# Patient Record
Sex: FEMALE | Race: BLACK OR AFRICAN AMERICAN
[De-identification: names, ages, dates, MRNs, and addresses within clinical notes are randomized per-mention and may not be internally consistent; named-entity substitution may affect disease eponyms.]

---

## 2018-05-23 ENCOUNTER — HOSPITAL ENCOUNTER (EMERGENCY)
Dept: HOSPITAL 74 - JERFT | Age: 68
Discharge: HOME | End: 2018-05-23
Payer: COMMERCIAL

## 2018-05-23 VITALS — DIASTOLIC BLOOD PRESSURE: 67 MMHG | SYSTOLIC BLOOD PRESSURE: 116 MMHG | TEMPERATURE: 98.2 F | HEART RATE: 66 BPM

## 2018-05-23 VITALS — BODY MASS INDEX: 27.3 KG/M2

## 2018-05-23 DIAGNOSIS — R73.03: ICD-10-CM

## 2018-05-23 DIAGNOSIS — I10: ICD-10-CM

## 2018-05-23 DIAGNOSIS — M54.6: Primary | ICD-10-CM

## 2018-05-23 LAB
APPEARANCE UR: CLEAR
BILIRUB UR STRIP.AUTO-MCNC: NEGATIVE MG/DL
COLOR UR: (no result)
KETONES UR QL STRIP: NEGATIVE
LEUKOCYTE ESTERASE UR QL STRIP.AUTO: (no result)
MUCOUS THREADS URNS QL MICRO: (no result)
NITRITE UR QL STRIP: NEGATIVE
PH UR: 6 [PH] (ref 5–8)
PROT UR QL STRIP: NEGATIVE
PROT UR QL STRIP: NEGATIVE
SP GR UR: 1.01 (ref 1–1.03)
UROBILINOGEN UR STRIP-MCNC: NEGATIVE MG/DL (ref 0.2–1)

## 2018-05-23 PROCEDURE — 3E0233Z INTRODUCTION OF ANTI-INFLAMMATORY INTO MUSCLE, PERCUTANEOUS APPROACH: ICD-10-PCS

## 2018-05-23 NOTE — PDOC
History of Present Illness





- General


Chief Complaint: Back Pain


Stated Complaint: BACK PAIN


Time Seen by Provider: 05/23/18 08:41


History Source: Patient


Exam Limitations: No Limitations





- History of Present Illness


Initial Comments: 





05/23/18 08:59


Best Contact: [phone]


PCP: Jessica





Pmhx: HDL, borderline diabetes, hypertension 


Pshx: Right thyroidectomy/2002, [facility]


Allergies: Percocet/nausea


FH:unk


Social Hx: Ciarettes/  0     Alcohol/     0      Drugs/0


LMP:


05/23/18 09:31


67-year-old female presents to the ER complaining of mid to lower right sided 

back pain 4 days. Pain is described as 7/10 dull nonradiating intermittent 

discomfort. 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 

facility that has her constantly doing strenuous activities. Patient states she 

took ibuprofen earlier yesterday morning with minimal relief. Patient denies 

any fall/injuries, she denies neck pain/stiffness, bladder or bowel dysfunction

, abdominal discomfort flank pains. Patient had similar symptoms from muscular 

back strains.





Past History





- Past Medical History


Allergies/Adverse Reactions: 


 Allergies











Allergy/AdvReac Type Severity Reaction Status Date / Time


 


No Known Allergies Allergy   Verified 05/23/18 08:29











Home Medications: 


Ambulatory Orders





Losartan 50Mg/Hctz 12.5MG [Hyzaar -] 1 tab PO DAILY 12/07/16 








Asthma: Yes


COPD: No


HTN: Yes


Hypercholesterolemia: Yes





- Surgical History


Abdominal Surgery: No


Appendectomy: No


Cardiac Surgery: No


Cholecystectomy: No


Gastric Stapling: No


GI Surgery: No


Lung Surgery: No


Neurologic Surgery: No


Orthopedic Surgery: No





- Immunization History


Immunization Up to Date: Yes





- Suicide/Smoking/Psychosocial Hx


Smoking Status: No


Smoking History: Never smoked


Have you smoked in the past 12 months: No


Number of Cigarettes Smoked Daily: 0


Information on smoking cessation initiated: No


Hx Alcohol Use: No


Drug/Substance Use Hx: No


Substance Use Type: None


Hx Substance Use Treatment: No





Trauma Specific PMHX





- Complaint Specific PMHX


Arthritis: No


Back Injury: No


Neck Injury: No


Hx Sacro Iliac Joint Dysfunction: No





**Review of Systems





- Review of Systems


Able to Perform ROS?: Yes


Comments:: 





05/23/18 08:59


CONSTITUTIONAL: 


Absent: fever, chills, diaphoresis, generalized weakness, malaise, loss of 

appetite


HEENT: 


Absent: rhinorrhea, nasal congestion, throat pain, throat swelling, difficulty 

swallowing, mouth swelling, ear pain, eye pain, visual Changes


CARDIOVASCULAR: 


Absent: chest pain, loss of consciousness, palpitations, irregular heart rate, 

peripheral edema


RESPIRATORY: 


Absent: cough, shortness of breath, dyspnea with exertion, orthopnea, wheezing, 

stridor, hemoptysis


GASTROINTESTINAL:


Absent: abdominal pain, abdominal distension, nausea, vomiting, diarrhea, 

constipation, melena, hematochezia


GENITOURINARY: 


Absent: dysuria, frequency, urgency, hesitancy, hematuria, flank pain, genital 

pain


MUSCULOSKELETAL: 


+right mid/lower back pain


Absent: myalgia, arthralgia, joint swelling


SKIN: 


Absent: rash, itching, pallor





Is the patient limited English proficient: No





*Physical Exam





- Vital Signs


 Last Vital Signs











Temp Pulse Resp BP Pulse Ox


 


 98.2 F   66   20   116/67   98 


 


 05/23/18 08:27  05/23/18 08:27  05/23/18 08:27  05/23/18 08:27  05/23/18 08:27














- Physical Exam


Comments: 





05/23/18 09:00


GENERAL:


Well developed, well nourished. Awake and alert. No acute distress.


HEENT:


Normocephalic, atraumatic. PERRLA, EOMI. No conjunctival pallor. Sclera are non-

icteric. Moist mucous membranes. Oropharynx is clear.


NECK: 


Supple. Full ROM. No JVD. Carotid pulses 2+ and symmetric, without bruits. No 

thyromegaly. No lymphadenopathy.


CARDIOVASCULAR:


Regular rate and rhythm. No murmurs, rubs, or gallops. Distal pulses are 2+ and 

symmetric. 


PULMONARY: 


No evidence of respiratory distress. Lungs clear to auscultation bilaterally. 

No wheezing, rales or rhonchi.


ABDOMINAL:


Soft. Non-tender. Non-distended. No rebound or guarding. No organomegaly. 

Normoactive bowel sounds. 


MUSCULOSKELETAL 


NEG SLR


Normal range of motion at all joints. No bony deformities or tenderness. No CVA 

tenderness.


EXTREMITIES: 


No cyanosis. No clubbing. No edema. No calf tenderness.


SKIN: 


Warm and dry. Normal capillary refill. No rashes. No jaundice. 


NEUROLOGICAL: 


Alert, awake, appropriate. Cranial nerves 2-12 intact. No deficits to light 

touch and temperature in face, upper extremities and lower extremities. No 

motor deficits in the in face, upper extremities and lower extremities. 

Normoreflexic in the upper and lower extremities. Normal speech. Toes are down-

going bilaterally. Gait is normal without ataxia.


PSYCHIATRIC: 


Cooperative. Good eye contact. Appropriate mood and affect.


05/23/18 09:33








Progress Note





- Progress Note


Progress Note: 





0933hrs: REASSESS/pt feels much better





*DC/Admit/Observation/Transfer


Diagnosis at time of Disposition: 


Back pain


Qualifiers:


 Back pain location: thoracic back pain Chronicity: acute Back pain laterality: 

right Qualified Code(s): M54.6 - Pain in thoracic spine








- Discharge Dispostion


Disposition: HOME


Condition at time of disposition: Stable


Decision to Admit order: No





- Referrals


Referrals: 


Heraclio Lopez MD [Primary Care Provider] - 


Mckinley Braga MD [Staff Physician] - 


Brant Max MD [Staff Physician] - 





- Patient Instructions


Printed Discharge Instructions:  DI for Thoracic Back Pain


Additional Instructions: 


Rest


Avoid heavy lifting


Follow-up with the neurosurgeon or orthopedic listed on your discharge


You refused any prescription pain medication for your discomfort so please take 

either Tylenol or Motrin every 6 hours as needed for pain


Return back to the emergency department for severe/persistent or worsening 

symptoms, bladder or bowel dysfunction.





- Post Discharge Activity


Forms/Work/School Notes:  Back to Work

## 2019-06-17 ENCOUNTER — HOSPITAL ENCOUNTER (EMERGENCY)
Dept: HOSPITAL 74 - JER | Age: 69
Discharge: HOME | End: 2019-06-17
Payer: COMMERCIAL

## 2019-11-11 ENCOUNTER — HOSPITAL ENCOUNTER (EMERGENCY)
Dept: HOSPITAL 74 - JER | Age: 69
Discharge: HOME | End: 2019-11-11
Payer: COMMERCIAL

## 2019-11-11 VITALS — TEMPERATURE: 98.9 F

## 2019-11-11 VITALS — HEART RATE: 65 BPM | SYSTOLIC BLOOD PRESSURE: 132 MMHG | DIASTOLIC BLOOD PRESSURE: 79 MMHG

## 2019-11-11 VITALS — BODY MASS INDEX: 32.1 KG/M2

## 2019-11-11 DIAGNOSIS — T37.8X5A: ICD-10-CM

## 2019-11-11 DIAGNOSIS — E11.9: ICD-10-CM

## 2019-11-11 DIAGNOSIS — L29.8: Primary | ICD-10-CM

## 2019-11-11 DIAGNOSIS — Z87.09: ICD-10-CM

## 2019-11-11 DIAGNOSIS — I10: ICD-10-CM

## 2019-11-11 DIAGNOSIS — E78.00: ICD-10-CM
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
## 2019-11-11 LAB
ALBUMIN SERPL-MCNC: 3.5 G/DL (ref 3.4–5)
ALP SERPL-CCNC: 79 U/L (ref 45–117)
ALT SERPL-CCNC: 21 U/L (ref 13–61)
ANION GAP SERPL CALC-SCNC: 5 MMOL/L (ref 8–16)
AST SERPL-CCNC: 37 U/L (ref 15–37)
BASOPHILS # BLD: 1.3 % (ref 0–2)
BILIRUB SERPL-MCNC: 0.3 MG/DL (ref 0.2–1)
BUN SERPL-MCNC: 20.5 MG/DL (ref 7–18)
CALCIUM SERPL-MCNC: 9.4 MG/DL (ref 8.5–10.1)
CHLORIDE SERPL-SCNC: 104 MMOL/L (ref 98–107)
CO2 SERPL-SCNC: 27 MMOL/L (ref 21–32)
CREAT SERPL-MCNC: 0.8 MG/DL (ref 0.55–1.3)
DEPRECATED RDW RBC AUTO: 16.6 % (ref 11.6–15.6)
EOSINOPHIL # BLD: 3.7 % (ref 0–4.5)
GLUCOSE SERPL-MCNC: 95 MG/DL (ref 74–106)
HCT VFR BLD CALC: 34.7 % (ref 32.4–45.2)
HGB BLD-MCNC: 11.3 GM/DL (ref 10.7–15.3)
LYMPHOCYTES # BLD: 51 % (ref 8–40)
MCH RBC QN AUTO: 27 PG (ref 25.7–33.7)
MCHC RBC AUTO-ENTMCNC: 32.6 G/DL (ref 32–36)
MCV RBC: 82.7 FL (ref 80–96)
MONOCYTES # BLD AUTO: 8 % (ref 3.8–10.2)
NEUTROPHILS # BLD: 36 % (ref 42.8–82.8)
PLATELET # BLD AUTO: 310 K/MM3 (ref 134–434)
PMV BLD: 8.3 FL (ref 7.5–11.1)
POTASSIUM SERPLBLD-SCNC: 4.8 MMOL/L (ref 3.5–5.1)
PROT SERPL-MCNC: 6.8 G/DL (ref 6.4–8.2)
RBC # BLD AUTO: 4.19 M/MM3 (ref 3.6–5.2)
SODIUM SERPL-SCNC: 137 MMOL/L (ref 136–145)
WBC # BLD AUTO: 6 K/MM3 (ref 4–10)

## 2019-11-11 NOTE — PDOC
Rapid Medical Evaluation


Time Seen by Provider: 11/11/19 16:24


Medical Evaluation: 


 Allergies











Allergy/AdvReac Type Severity Reaction Status Date / Time


 


No Known Allergies Allergy   Verified 05/23/18 08:29











11/11/19 16:25


Pt presents for evaluation itching starting yesterday. Also reports rash in the 

inner thighs. Tried to see her PCP today, but they didn't have any 

appointments. 


Exam: Deferred to provider. no obvious rash


Orders: benadryl


Pt to proceed to the ER for further evaluation 





**Discharge Disposition





- Diagnosis


 Itching








- Referrals





- Patient Instructions





- Post Discharge Activity

## 2019-11-11 NOTE — PDOC
Documentation entered by Noni Mckinley SCRIBE, acting as scribe for Brielle Guo MD.








Brielle Guo MD:  This documentation has been prepared by the Elías lowe Nirvannie, SCRIBE, under my direction and personally reviewed by me in 

its entirety.  I confirm that the documentation accurately reflects all work, 

treatment, procedures, and medical decision making performed by me.  





Attending Attestation





- Resident


Resident Name: Krishan Lopez





- ED Attending Attestation


I have performed the following: I have examined & evaluated the patient, The 

case was reviewed & discussed with the resident, I agree w/resident's findings 

& plan





- HPI


HPI: 





11/11/19 17:35


68 yo female has c/o pruretic rash for 1 day


11/11/19 18:12








- Physicial Exam


PE: 





11/11/19 18:12


I agree with Dr Lopez's physical exam


11/12/19 00:09








- Medical Decision Making





11/11/19 18:51


pt states she was started on a antibiotic on Thursday for UTI


11/11/19 18:56


labs,cbc and chemistries essentially unremarkable


11/12/19 00:09


imp  drug allergy


plan  changed her macrobid to keflex for her UTI

## 2019-11-11 NOTE — PDOC
History of Present Illness





- General


Chief Complaint: Itching


Stated Complaint: BODY ITCH RASH


Time Seen by Provider: 11/11/19 16:24





- History of Present Illness


Initial Comments: 





11/11/19 17:43


  Pt is a 68 y/o F with a significant past medical history of HTN and 

borderline DM who presents to our emergency department due to diffuse pruritis. 

Pt endorses that she has been experiencing the itching for 1 day duration. Pt 

denies any gardening, new shampoos/detergents, new foods, or any other possible 

precipitating factors. Has never experienced this rash before. Pt endorses she 

was placed on Macrobid by her Urogynecologist on the 7th of this month for UTI'

s.  

















Past History





- Past Medical History


Allergies/Adverse Reactions: 


 Allergies











Allergy/AdvReac Type Severity Reaction Status Date / Time


 


No Known Allergies Allergy   Verified 11/11/19 18:23











Home Medications: 


Ambulatory Orders





Losartan/Hydrochlorothiazide [Losartan-Hctz 100-25 mg Tab] 1 each PO DAILY 06/17 /19 


Calamine 8% Topical Lotion - 1 applic TP DAILY #1 bottle 11/11/19 


Cephalexin Monohydrate [Keflex -] 500 mg PO BID #6 capsule 11/11/19 








Asthma: Yes


COPD: No


HTN: Yes


Hypercholesterolemia: Yes





- Surgical History


Abdominal Surgery: No


Appendectomy: No


Cardiac Surgery: No


Cholecystectomy: No


Gastric Stapling: No


GI Surgery: No


Lung Surgery: No


Neurologic Surgery: No


Orthopedic Surgery: No





- Immunization History


Immunization Up to Date: Yes





- Psycho Social/Smoking Cessation Hx


Smoking Status: No


Smoking History: Never smoked


Have you smoked in the past 12 months: No


Number of Cigarettes Smoked Daily: 0


Hx Alcohol Use: No


Drug/Substance Use Hx: No


Substance Use Type: None


Hx Substance Use Treatment: No





**Review of Systems





- Review of Systems


Able to Perform ROS?: Yes


Is the patient limited English proficient: No


Constitutional: No: Chills, Fever


HEENTM: No: Blurred Vision


Respiratory: No: Cough, Shortness of Breath, SOB with Exertion, SOB at Rest


Cardiac (ROS): No: Chest Pain, Irregular Heart Rate


ABD/GI: No: Constipated, Diarrhea


: No: Burning, Dysuria


Integumentary: Yes: Symptoms Reported, Pruritus, Rash


Neurological: No: Numbness, Paresthesia, Weakness





*Physical Exam





- Vital Signs


 Last Vital Signs











Temp Pulse Resp BP Pulse Ox


 


 98.9 F   67   20   136/76   95 


 


 11/11/19 16:50  11/11/19 16:50  11/11/19 16:50  11/11/19 16:50  11/11/19 16:50














- Physical Exam


General Appearance: Yes: Nourished.  No: Mild Distress


HEENT: positive: EOMI, Normal ENT Inspection.  negative: Scleral Icterus (R), 

Scleral Icterus (L)


Neck: positive: Supple


Respiratory/Chest: positive: Lungs Clear, Normal Breath Sounds


Cardiovascular: positive: Regular Rhythm, S1, S2


Gastrointestinal/Abdominal: negative: Tender, Flat, Soft, Guarding, Rebound, 

Tenderness


Integumentary: positive: Rash (rash inner right thigh. Papular, erythematous, 

nonblanching. )


Neurologic: positive: CNs II-XII NML intact





ED Treatment Course





- LABORATORY


CBC & Chemistry Diagram: 


 11/11/19 17:50





 11/11/19 17:50





- Medications


Given in the ED: 


ED Medications














Discontinued Medications














Generic Name Dose Route Start Last Admin





  Trade Name Deidra  PRN Reason Stop Dose Admin


 


Diphenhydramine HCl  50 mg  11/11/19 16:27  11/11/19 16:55





  Benadryl -  PO  11/11/19 16:28  50 mg





  ONCE ONE   Administration





     





     





     





     














Medical Decision Making





- Medical Decision Making





11/11/19 17:50


will order cbc and cmp


calamine lotion for rash. 


11/11/19 19:07


Pt discharged on Keflex in light of recent UTI diagnosis and possible rash 

attributed to Macrobid prescription. 





Discharge





- Discharge Information


Clinical Impression/Diagnosis: 


 Itching








- Additional Discharge Information


Prescriptions: 


Calamine 8% Topical Lotion - 1 applic TP DAILY #1 bottle


Cephalexin Monohydrate [Keflex -] 500 mg PO BID #6 capsule





- Follow up/Referral


Referrals: 


Heraclio Lopez MD [Primary Care Provider] - 





- Patient Discharge Instructions





- Post Discharge Activity

## 2020-11-10 ENCOUNTER — HOSPITAL ENCOUNTER (EMERGENCY)
Dept: HOSPITAL 74 - JER | Age: 70
Discharge: HOME | End: 2020-11-10
Payer: COMMERCIAL

## 2020-11-10 VITALS — TEMPERATURE: 98.6 F

## 2020-11-10 VITALS — HEART RATE: 54 BPM | SYSTOLIC BLOOD PRESSURE: 136 MMHG | DIASTOLIC BLOOD PRESSURE: 77 MMHG

## 2020-11-10 VITALS — BODY MASS INDEX: 27.4 KG/M2

## 2020-11-10 DIAGNOSIS — R10.9: Primary | ICD-10-CM

## 2020-11-10 LAB
ALBUMIN SERPL-MCNC: 3.9 G/DL (ref 3.4–5)
ALP SERPL-CCNC: 69 U/L (ref 45–117)
ALT SERPL-CCNC: 21 U/L (ref 13–61)
ANION GAP SERPL CALC-SCNC: 5 MMOL/L (ref 8–16)
APPEARANCE UR: CLEAR
AST SERPL-CCNC: 14 U/L (ref 15–37)
BACTERIA # UR AUTO: (no result) /UL (ref 0–1359)
BASOPHILS # BLD: 0.7 % (ref 0–2)
BILIRUB SERPL-MCNC: 0.4 MG/DL (ref 0.2–1)
BILIRUB UR STRIP.AUTO-MCNC: NEGATIVE MG/DL
BUN SERPL-MCNC: 20.3 MG/DL (ref 7–18)
CALCIUM SERPL-MCNC: 9.3 MG/DL (ref 8.5–10.1)
CASTS URNS QL MICRO: 3 /UL (ref 0–3.1)
CHLORIDE SERPL-SCNC: 108 MMOL/L (ref 98–107)
CO2 SERPL-SCNC: 29 MMOL/L (ref 21–32)
COLOR UR: YELLOW
CREAT SERPL-MCNC: 1.1 MG/DL (ref 0.55–1.3)
DEPRECATED RDW RBC AUTO: 16.7 % (ref 11.6–15.6)
EOSINOPHIL # BLD: 4.8 % (ref 0–4.5)
EPITH CASTS URNS QL MICRO: 30 /UL (ref 0–25.1)
GLUCOSE SERPL-MCNC: 104 MG/DL (ref 74–106)
HCT VFR BLD CALC: 34.4 % (ref 32.4–45.2)
HGB BLD-MCNC: 11.1 GM/DL (ref 10.7–15.3)
KETONES UR QL STRIP: (no result)
LEUKOCYTE ESTERASE UR QL STRIP.AUTO: (no result)
LYMPHOCYTES # BLD: 55.9 % (ref 8–40)
MCH RBC QN AUTO: 27.2 PG (ref 25.7–33.7)
MCHC RBC AUTO-ENTMCNC: 32.5 G/DL (ref 32–36)
MCV RBC: 83.8 FL (ref 80–96)
MONOCYTES # BLD AUTO: 8 % (ref 3.8–10.2)
NEUTROPHILS # BLD: 30.6 % (ref 42.8–82.8)
NITRITE UR QL STRIP: POSITIVE
PH UR: 5.5 [PH] (ref 5–8)
PLATELET # BLD AUTO: 301 K/MM3 (ref 134–434)
PMV BLD: 8.4 FL (ref 7.5–11.1)
POTASSIUM SERPLBLD-SCNC: 4.3 MMOL/L (ref 3.5–5.1)
PROT SERPL-MCNC: 6.7 G/DL (ref 6.4–8.2)
PROT UR QL STRIP: NEGATIVE
PROT UR QL STRIP: NEGATIVE
RBC # BLD AUTO: 4 /UL (ref 0–23.9)
RBC # BLD AUTO: 4.1 M/MM3 (ref 3.6–5.2)
SODIUM SERPL-SCNC: 141 MMOL/L (ref 136–145)
SP GR UR: 1.03 (ref 1.01–1.03)
UROBILINOGEN UR STRIP-MCNC: 0.2 MG/DL (ref 0.2–1)
WBC # BLD AUTO: 5.4 K/MM3 (ref 4–10)
WBC # UR AUTO: 69 /UL (ref 0–25.8)

## 2020-11-10 PROCEDURE — 3E0337Z INTRODUCTION OF ELECTROLYTIC AND WATER BALANCE SUBSTANCE INTO PERIPHERAL VEIN, PERCUTANEOUS APPROACH: ICD-10-PCS

## 2020-11-10 PROCEDURE — 3E0333Z INTRODUCTION OF ANTI-INFLAMMATORY INTO PERIPHERAL VEIN, PERCUTANEOUS APPROACH: ICD-10-PCS

## 2020-11-10 PROCEDURE — 3E03329 INTRODUCTION OF OTHER ANTI-INFECTIVE INTO PERIPHERAL VEIN, PERCUTANEOUS APPROACH: ICD-10-PCS

## 2022-06-10 ENCOUNTER — HOSPITAL ENCOUNTER (EMERGENCY)
Dept: HOSPITAL 74 - JER | Age: 72
Discharge: HOME | End: 2022-06-10
Payer: COMMERCIAL

## 2022-06-10 VITALS — BODY MASS INDEX: 27.3 KG/M2

## 2022-06-10 VITALS — TEMPERATURE: 98.2 F | HEART RATE: 64 BPM

## 2022-06-10 VITALS — DIASTOLIC BLOOD PRESSURE: 80 MMHG | SYSTOLIC BLOOD PRESSURE: 150 MMHG

## 2022-06-10 DIAGNOSIS — R51.9: Primary | ICD-10-CM

## 2022-06-10 LAB
ALBUMIN SERPL-MCNC: 3.8 G/DL (ref 3.4–5)
ALP SERPL-CCNC: 70 U/L (ref 45–117)
ALT SERPL-CCNC: 20 U/L (ref 13–61)
ANION GAP SERPL CALC-SCNC: 8 MMOL/L (ref 8–16)
AST SERPL-CCNC: 18 U/L (ref 15–37)
BASOPHILS # BLD: 1 % (ref 0–2)
BILIRUB SERPL-MCNC: 0.3 MG/DL (ref 0.2–1)
BUN SERPL-MCNC: 13.1 MG/DL (ref 7–18)
CALCIUM SERPL-MCNC: 9.3 MG/DL (ref 8.5–10.1)
CHLORIDE SERPL-SCNC: 106 MMOL/L (ref 98–107)
CO2 SERPL-SCNC: 27 MMOL/L (ref 21–32)
CREAT SERPL-MCNC: 0.8 MG/DL (ref 0.55–1.3)
DEPRECATED RDW RBC AUTO: 16.9 % (ref 11.6–15.6)
EOSINOPHIL # BLD: 3.9 % (ref 0–4.5)
ERYTHROCYTE [SEDIMENTATION RATE] IN BLOOD BY WESTERGREN METHOD: 31 MM/HR (ref 0–30)
GLUCOSE SERPL-MCNC: 110 MG/DL (ref 74–106)
HCT VFR BLD CALC: 33.4 % (ref 32.4–45.2)
HGB BLD-MCNC: 10.9 GM/DL (ref 10.7–15.3)
LYMPHOCYTES # BLD: 44.2 % (ref 8–40)
MCH RBC QN AUTO: 27 PG (ref 25.7–33.7)
MCHC RBC AUTO-ENTMCNC: 32.7 G/DL (ref 32–36)
MCV RBC: 82.4 FL (ref 80–96)
MONOCYTES # BLD AUTO: 10.5 % (ref 3.8–10.2)
NEUTROPHILS # BLD: 40.4 % (ref 42.8–82.8)
PLATELET # BLD AUTO: 288 10^3/UL (ref 134–434)
PMV BLD: 8.1 FL (ref 7.5–11.1)
PROT SERPL-MCNC: 6.9 G/DL (ref 6.4–8.2)
RBC # BLD AUTO: 4.05 M/MM3 (ref 3.6–5.2)
SODIUM SERPL-SCNC: 140 MMOL/L (ref 136–145)
WBC # BLD AUTO: 5.3 K/MM3 (ref 4–10)

## 2022-06-10 PROCEDURE — 3E033GC INTRODUCTION OF OTHER THERAPEUTIC SUBSTANCE INTO PERIPHERAL VEIN, PERCUTANEOUS APPROACH: ICD-10-PCS | Performed by: EMERGENCY MEDICINE

## 2022-08-03 ENCOUNTER — HOSPITAL ENCOUNTER (EMERGENCY)
Dept: HOSPITAL 74 - JER | Age: 72
Discharge: HOME | End: 2022-08-03
Payer: COMMERCIAL

## 2022-08-03 VITALS
DIASTOLIC BLOOD PRESSURE: 82 MMHG | TEMPERATURE: 98.1 F | HEART RATE: 63 BPM | SYSTOLIC BLOOD PRESSURE: 144 MMHG | RESPIRATION RATE: 17 BRPM

## 2022-08-03 VITALS — BODY MASS INDEX: 39.8 KG/M2

## 2022-08-03 DIAGNOSIS — M54.50: Primary | ICD-10-CM

## 2022-08-03 LAB
APPEARANCE UR: CLEAR
BILIRUB UR STRIP.AUTO-MCNC: NEGATIVE MG/DL
COLOR UR: YELLOW
KETONES UR QL STRIP: NEGATIVE
LEUKOCYTE ESTERASE UR QL STRIP.AUTO: NEGATIVE
NITRITE UR QL STRIP: NEGATIVE
PH UR: 7.5 [PH] (ref 5–8)
PROT UR QL STRIP: (no result)
PROT UR QL STRIP: NEGATIVE
SP GR UR: 1.01 (ref 1.01–1.03)
UROBILINOGEN UR STRIP-MCNC: 0.2 MG/DL (ref 0.2–1)

## 2022-08-03 PROCEDURE — 3E023GC INTRODUCTION OF OTHER THERAPEUTIC SUBSTANCE INTO MUSCLE, PERCUTANEOUS APPROACH: ICD-10-PCS
